# Patient Record
Sex: FEMALE | Race: BLACK OR AFRICAN AMERICAN | NOT HISPANIC OR LATINO | URBAN - NONMETROPOLITAN AREA
[De-identification: names, ages, dates, MRNs, and addresses within clinical notes are randomized per-mention and may not be internally consistent; named-entity substitution may affect disease eponyms.]

---

## 2021-09-23 ENCOUNTER — IMPORTED ENCOUNTER (OUTPATIENT)
Dept: URBAN - NONMETROPOLITAN AREA CLINIC 1 | Facility: CLINIC | Age: 66
End: 2021-09-23

## 2021-09-23 PROBLEM — H52.4: Noted: 2021-09-23

## 2021-09-23 PROBLEM — H25.13: Noted: 2021-09-23

## 2021-09-23 PROCEDURE — S0620 ROUTINE OPHTHALMOLOGICAL EXA: HCPCS

## 2021-09-23 NOTE — PATIENT DISCUSSION
Presbyopia OUDiscussed refractive status in detail with patient. New glasses Rx given today. Continue to monitor. [facility] OUDiscussed diagnosis with patient. Reviewed symptoms related to cataract progression. No treatment required at this time. Continue to monitor.

## 2022-04-10 ASSESSMENT — TONOMETRY
OD_IOP_MMHG: 12
OS_IOP_MMHG: 12

## 2022-04-10 ASSESSMENT — VISUAL ACUITY
OD_CC: 20/25-
OS_CC: 20/25-